# Patient Record
Sex: MALE | Race: ASIAN
[De-identification: names, ages, dates, MRNs, and addresses within clinical notes are randomized per-mention and may not be internally consistent; named-entity substitution may affect disease eponyms.]

---

## 2023-05-18 ENCOUNTER — HOSPITAL ENCOUNTER (EMERGENCY)
Dept: HOSPITAL 76 - ED | Age: 28
Discharge: HOME | End: 2023-05-18
Payer: COMMERCIAL

## 2023-05-18 VITALS — SYSTOLIC BLOOD PRESSURE: 134 MMHG | DIASTOLIC BLOOD PRESSURE: 79 MMHG

## 2023-05-18 DIAGNOSIS — F17.200: ICD-10-CM

## 2023-05-18 DIAGNOSIS — R11.2: Primary | ICD-10-CM

## 2023-05-18 DIAGNOSIS — R19.7: ICD-10-CM

## 2023-05-18 PROCEDURE — 99283 EMERGENCY DEPT VISIT LOW MDM: CPT

## 2023-05-18 PROCEDURE — 96374 THER/PROPH/DIAG INJ IV PUSH: CPT

## 2023-05-18 NOTE — ED PHYSICIAN DOCUMENTATION
History of Present Illness





- Stated complaint


Stated Complaint: DIARRHEA/VOMIT





- Chief complaint


Chief Complaint: General





- Additonal information


Additional information: 





27-year-old male presents emergency department for evaluation of vomiting and 

diarrhea that began yesterday.  Has had multiple episodes of diarrhea and has 

been unable to keep anything down today.  He initially had fevers at the onset 

of his symptoms yesterday but none today.  No bloody output.  No similar in 

others at home.  No recent travel.  He denies abdominal pain.  He is here at the

behest of his wife.





Denies any pertinent past medical history.  Takes no prescribed medications.  

Denies alcohol or tobacco use.





Review of Systems


Constitutional: reports: Fever


GI: reports: Vomiting, Diarrhea.  denies: Abdominal Pain, Hematemesis, Bloody / 

black stool


: reports: Reviewed and negative


Skin: reports: Reviewed and negative





PD PAST MEDICAL HISTORY





- Past Medical History


Past Medical History: No


Cardiovascular: None


Respiratory: None


Neuro: None


Endocrine/Autoimmune: None


GI: None


: None


HEENT: None


Psych: None


Musculoskeletal: None


Derm: None





- Past Surgical History


Past Surgical History: No





- Present Medications


Home Medications: 


                                Ambulatory Orders











 Medication  Instructions  Recorded  Confirmed


 


Ondansetron Odt [Zofran] 4 mg TL Q6H PRN #10 tablet 05/18/23 














- Allergies


Allergies/Adverse Reactions: 


                                    Allergies











Allergy/AdvReac Type Severity Reaction Status Date / Time


 


No Known Drug Allergies Allergy   Verified 05/18/23 20:06














- Social History


Does the pt smoke?: Yes


Smoking Status: Current every day smoker


Does the pt drink ETOH?: No


Does the pt have substance abuse?: No





- Immunizations


Immunizations are current?: Yes





- POLST


Patient has POLST: No





PD ED PE NORMAL





- General


General: Alert and oriented X 3, No acute distress





- HEENT


HEENT: PERRL





- Neck


Neck: Supple, no meningeal sign, No adenopathy





- Cardiac


Cardiac: RRR, No murmur





- Respiratory


Respiratory: No respiratory distress, Clear bilaterally





- Abdomen


Abdomen: Normal bowel sounds, Soft.  No: Non tender (Unable to elicit any 

significant abdominal tenderness with bladder deep patient or percussion.)





- Back


Back: No CVA TTP





- Derm


Derm: Warm and dry





- Neuro


Neuro: Alert and oriented X 3


Eye Opening: Spontaneous


Motor: Obeys Commands


Verbal: Oriented


GCS Score: 15





Results





- Vitals


Vitals: 


                               Vital Signs - 24 hr











  05/18/23





  19:58


 


Temperature 36.8 C


 


Heart Rate 94


 


Respiratory 17





Rate 


 


Blood Pressure 123/83 H


 


O2 Saturation 98








                                     Oxygen











O2 Source                      Room air

















PD Medical Decision Making





- ED course


Complexity details: re-evaluated patient, d/w patient


ED course: 





27-year-old male presents emergency department for evaluation of 24 hours nausea

 vomiting and diarrhea.  Has been unable to keep anything down since yesterday 

evening.  On exam he does appear well.  He has no fevers, tachycardia or 

hypotension.  His abdominal exam was benign without any significant tenderness 

elicited.  I discussed with patient likely etiology of this to include viral 

illness.  He was given a single dose of IV Zofran as well as a liter of IV 

fluids.  Following this he was able to sip 250 mL of clear liquids without any 

vomiting or nausea.  Given this he is feeling better and does request discharge 

home.  I have written a prescription for Zofran to be filled at his preferred 

pharmacy.  I deferred labs or imaging today given his otherwise well appearance 

and benign exam.  Clinically I am not suspicious for occult surgical process 

such as acute appendicitis. The usual emergent return precautions were discussed

 for worsening symptoms





Departure





- Departure


Disposition: 01 Home, Self Care


Clinical Impression: 


 Nausea vomiting and diarrhea





Condition: Stable


Record reviewed to determine appropriate education?: Yes


Prescriptions: 


Ondansetron Odt [Zofran] 4 mg TL Q6H PRN #10 tablet


 PRN Reason: Nausea / Vomiting


Comments: 


You are seen today in the emergency department because yesterday began having 

vomiting and diarrhea.  As discussed at the bedside your clinical exam was 

rather reassuring.  Most causes of vomiting and diarrhea like you have had are 

usually due to a virus and will improve over time even without medical 

treatment.





Here in the emergency department we did give you a single dose of Zofran and 

nausea medicine which has allowed you to keep some sips of soda down.  We did al

so give you some IV fluids.





In general I expect that you are going to continue to get better.  I [place] a

 [place]  [place] can

 take this 2-3 times a day as needed for nausea.  I encourage you to have 

frequent sips of clear liquids such as water, broth, juice over the next 24 

hours.  If nausea is improving then you can expand your diet to simple foods 

such as bananas, rice, applesauce or toast.





I expect that your diarrhea will self resolve.  If you find you are still having

 persistent diarrhea after 3 to 4 days it is a appropriate to consider a single 

dose of an over-the-counter medication like Imodium.  Use this cautiously as it 

can cause constipation.





If at any point you find that your symptoms are worsening, you have uncontrolled

 vomiting, any black or bloody stools then you should return immediately to the 

ER for second evaluation